# Patient Record
Sex: MALE | Race: WHITE
[De-identification: names, ages, dates, MRNs, and addresses within clinical notes are randomized per-mention and may not be internally consistent; named-entity substitution may affect disease eponyms.]

---

## 2021-07-13 ENCOUNTER — HOSPITAL ENCOUNTER (EMERGENCY)
Dept: HOSPITAL 76 - ED | Age: 26
Discharge: HOME | End: 2021-07-13
Payer: COMMERCIAL

## 2021-07-13 VITALS — SYSTOLIC BLOOD PRESSURE: 138 MMHG | DIASTOLIC BLOOD PRESSURE: 61 MMHG

## 2021-07-13 DIAGNOSIS — Z87.891: ICD-10-CM

## 2021-07-13 DIAGNOSIS — B97.89: ICD-10-CM

## 2021-07-13 DIAGNOSIS — R05: ICD-10-CM

## 2021-07-13 DIAGNOSIS — Z20.822: ICD-10-CM

## 2021-07-13 DIAGNOSIS — J06.9: Primary | ICD-10-CM

## 2021-07-13 PROCEDURE — 99284 EMERGENCY DEPT VISIT MOD MDM: CPT

## 2021-07-13 PROCEDURE — 94640 AIRWAY INHALATION TREATMENT: CPT

## 2021-07-13 NOTE — ED PHYSICIAN DOCUMENTATION
History of Present Illness





- Stated complaint


Stated Complaint: COUGH/CONGESTION





- Chief complaint


Chief Complaint: Resp





- Additonal information


Additional information: 


25-year-old male presents the emergency department for evaluation of a cough 

that has developed over the last 10 days no fevers or chills.  Reports that he 

has received the appropriate to dose Maduri vaccine in April.  No recent travel.

 States the cough is worse in the morning when he wakes up and especially at 

night when supine.  He is denying chest pain or shortness of air.  He is taken 

over-the-counter Sudafed as well as Dimetapp without relief of the cough symptom

s.  Denies a sore throat.








Review of Systems


Constitutional: denies: Fever, Chills


Eyes: reports: Reviewed and negative


Ears: reports: Reviewed and negative


Nose: reports: Reviewed and negative


Throat: reports: Reviewed and negative


Cardiac: reports: Reviewed and negative


Respiratory: reports: Cough.  denies: Dyspnea, Hemoptysis, Wheezing


GI: reports: Reviewed and negative


: reports: Reviewed and negative


Skin: reports: Reviewed and negative


Musculoskeletal: reports: Reviewed and negative





PD PAST MEDICAL HISTORY





- Past Medical History


Past Medical History: No





- Past Surgical History


Past Surgical History: Yes


Derm: Skin cancer surgery





- Present Medications


Home Medications: 


                                Ambulatory Orders











 Medication  Instructions  Recorded  Confirmed


 


Albuterol Sulf [Ventolin Hfa 1 - 2 puffs INH Q4HR PRN #1 inhaler 07/13/21 





Inhaler]   














- Allergies


Allergies/Adverse Reactions: 


                                    Allergies











Allergy/AdvReac Type Severity Reaction Status Date / Time


 


No Known Drug Allergies Allergy   Verified 07/13/21 18:09














- Social History


Does the pt smoke?: No


Smoking Status: Former smoker


Does the pt drink ETOH?: Yes


Does the pt have substance abuse?: No





- Immunizations


Immunizations are current?: Yes





PD ED PE NORMAL





- General


General: Alert and oriented X 3, No acute distress





- HEENT


HEENT: PERRL





- Neck


Neck: Supple, no meningeal sign





- Cardiac


Cardiac: RRR, No murmur





- Respiratory


Respiratory: Clear bilaterally, Other (Cough elicited with full deep breath.)





- Abdomen


Abdomen: Normal bowel sounds, Soft, Non tender, Non distended





- Male 


Male : Deferred





- Back


Back: No CVA TTP, No spinal TTP





Results





- Vitals


Vitals: 


                               Vital Signs - 24 hr











  07/13/21





  18:09


 


Temperature 36.5 C


 


Heart Rate 80


 


Respiratory 16





Rate 


 


Blood Pressure 150/70 H


 


O2 Saturation 99








                                     Oxygen











O2 Source                      Room air

















- Rads (name of study)


  ** CXR


Radiology: Final report received (no acute cardiopulmonary process)





PD MEDICAL DECISION MAKING





- ED course


Complexity details: reviewed results, re-evaluated patient, d/w patient


ED course: 





This is a well-appearing 25-year-old male that presents to the ER for evaluation

of cough for 10 days.  No fevers.  Some congestion.  Chest x-ray is unrevealing.

 He was not hypoxic but every time he took a deep breath it did cause him to 

have a coughing fit.  I did give this gentleman albuterol with a spacer which 

markedly improved his symptoms.  A Covid screen is pending but at this time I 

will defer antibiotics as he does not clinically have findings consistent with a

pneumonia.  Because he got relief with the albuterol I will prescribe that as an

outpatient also recommend Benadryl at night because postnasal drip is likely 

contributing to some of the symptoms.  Emergent return precautions were 

discussed for worsening symptoms.





Departure





- Departure


Disposition: 01 Home, Self Care


Clinical Impression: 


 Cough





Upper respiratory infection


Qualifiers:


 URI type: unspecified viral URI Qualified Code(s): J06.9 - Acute upper 

respiratory infection, unspecified





Condition: Stable


Record reviewed to determine appropriate education?: Yes


Instructions:  ED Viral Syndrome


Prescriptions: 


Albuterol Sulf [Ventolin Hfa Inhaler] 1 - 2 puffs INH Q4HR PRN #1 inhaler


 PRN Reason: Shortness Of Air/Wheezing


Comments: 


You were seen in the ER today for a cough that has persisted for about 10 days. 

Your chest x-ray is normal and does not show pneumonia.  I suspect that you 

likely have seasonal allergies or virus causing the cough.  I would like you to 

use the albuterol that I prescribed with a spacer 4-6 times a day.  He would 

also benefit from using some Benadryl at night which can help with postnasal 

drip and dry up the congestion that is likely contributing to your symptoms.





You have a Covid test pending.  You need to self quarantine until the result is 

done and negative.  Do not leave your house.  Do not get near anybody.  The 

results should be done in 48 to 72 hours.  We will call with a positive result, 

the fastest way to get a negative result for confirmation though is to go to the

hospital website at [email or web address], click on the my [email or web address] tab and

sign up for the patient portal.





If any friends or family get sick and would like to have a Covid test done, but 

do not have signs or symptoms that would necessitate being hospitalized, we 

encourage testing through our coronavirus swabbing station, call [phone] to

schedule an appointment.





If your symptoms are not improving, your cough worsens, you develop chest pain 

or shortness of air or you have any fevers then please return immediately to the

ER for a second evaluation.

## 2022-07-16 NOTE — XRAY REPORT
PROCEDURE:  Chest 1 View X-Ray

 

INDICATIONS:  chest pain

 

TECHNIQUE:  One view of the chest was acquired.  

 

COMPARISON:  None.

 

FINDINGS:  

 

Surgical changes and devices:  None.  

 

Lungs and pleura:  No pleural effusions or pneumothorax. Left costophrenic angle has been excluded. L
ungs are otherwise clear.  

 

Mediastinum:  Mediastinal contours appear normal.  Heart size is normal.  

 

Bones and chest wall:  No suspicious bony lesions.  Overlying soft tissues appear unremarkable.  

 

IMPRESSION:  

Chest without acute cardiopulmonary abnormalities. Of note, the left costophrenic angle was excluded 
off the field-of-view.

 

Reviewed by: Jose De Jesus Cruz MD on 7/13/2021 8:44 PM PDT

Approved by: Jose De Jesus Cruz MD on 7/13/2021 8:44 PM PDT

 

 

Station ID:  [id number] Rest the affected joint, avoiding heavy lifting or activities that aggravate the pain. Let pain be your guide with working, walking and activities. Ice the affected joint every 4-6 hours for 10-15 minutes at a time for the first 24 - 48 hours. After48 hours you may want to try to rotate in heat. Try to keep the affected joint elevated above the level of the heart to prevent more swelling. OTC pain medication as discussed. Tylenol 650-1000 mg every 6-8 hours as needed for pain. Voltaren gel 2 gm to the affected shoulder 4 times daily as needed- massage into the joint pain. Tizanidine (muscle relaxer) 2 mg every bedtime, as needed for pain. May cause drowsiness, balance issues, increase fall risk- blurry vision, constipation, urinary retention. Worsening pain, swelling, change in symptoms should be re-evaluated.    If not improving in a couple weeks, follow up with your primary physician to discuss further evaluation, and/or PT.

## 2022-10-24 ENCOUNTER — HOSPITAL ENCOUNTER (OUTPATIENT)
Dept: HOSPITAL 76 - DI | Age: 27
Discharge: HOME | End: 2022-10-24
Attending: STUDENT IN AN ORGANIZED HEALTH CARE EDUCATION/TRAINING PROGRAM
Payer: COMMERCIAL

## 2022-10-24 DIAGNOSIS — M25.512: Primary | ICD-10-CM

## 2022-10-24 PROCEDURE — 20550 NJX 1 TENDON SHEATH/LIGAMENT: CPT

## 2022-10-24 NOTE — ULTRASOUND REPORT
PROCEDURE:  Injection Single Tendon

 

INDICATIONS:  PAIN IN LEFT SHOULDER

 

TECHNIQUE:  

The indications, alternatives, benefits, risks, and complications of the procedure were explained to 
the patient.  Written informed consent was obtained and placed in the chart.  The patient was placed 
in an appropriate position on the fluoroscopy table, and a site was chosen for percutaneous access un
justin ultrasound guidance.  Local anesthetic was administered using a 1% lidocaine solution.  A hypoder
vicky or spinal needle was then used to access the symptomatic joint.  Intra-articular location of the 
needle tip was confirmed by real time ultrasound imaging, followed by steroid administration.  The ne
edle was then withdrawn, and a bandage applied to the puncture site.  

 

FINDINGS: 

Joint injected:  Left biceps tendon

Medications injected:  1 mL of 40 mg/mL Kenalog and 0.5% Ropivacaine mixture.  

Complications:  None.  

 

IMPRESSION:  Successful ultrasound guided administration of steroid and anaesthetic solution into the
 left biceps tendon sheath.  

 

Reviewed by: Dakota Bledsoe on 10/24/2022 12:05 PM PDT

Approved by: Dakota Bledsoe on 10/24/2022 12:05 PM PDT

 

 

Station ID:  [id number]

## 2023-01-12 ENCOUNTER — HOSPITAL ENCOUNTER (OUTPATIENT)
Dept: HOSPITAL 76 - SC | Age: 28
Discharge: HOME | End: 2023-01-12
Attending: NURSE PRACTITIONER
Payer: COMMERCIAL

## 2023-01-12 VITALS — DIASTOLIC BLOOD PRESSURE: 70 MMHG | SYSTOLIC BLOOD PRESSURE: 114 MMHG

## 2023-01-12 DIAGNOSIS — G47.33: Primary | ICD-10-CM

## 2023-01-12 DIAGNOSIS — E66.9: ICD-10-CM

## 2023-01-12 PROCEDURE — 99212 OFFICE O/P EST SF 10 MIN: CPT

## 2023-01-12 PROCEDURE — 99203 OFFICE O/P NEW LOW 30 MIN: CPT

## 2023-01-12 NOTE — SLEEP CARE CONSULTATION
Information from patient questionnaire entered by Jessica Garcia.





I have reviewed and concur with the information entered by Jessica Garcia. This 

document represents the service I personally performed and the decisions made by

me, Ping Ying ARNP.





History of Present Illness


Service Date and Time: 01/12/2023    1242


Reason for Visit: New patient, sleep apnea on CPAP therapy


Chief Complaint: reports: Unrefreshed sleep, Snoring, Excessive daytime 

sleepiness, Fatigue, Other (UPDATE SUPPLIES)


Date of Onset: SINCE 2015


Usual bedtime: 9PM-10PM


Time it takes to fall asleep: USUALLY NOT LONG 


Snores at night: Yes


Observed to quit breathing while asleep: No


Sleeps alone due to snoring: No


Number of times waking at night: 1-2


Reasons for waking at night: reports: Snoring, Other (DOGS)


Toss, Turn, or Twitch while sleeping: Yes


Recalls having dreams: Yes


Usually gets out of bed at: 5-6AM


Feels refreshed in the morning: No


Morning headache: No


Sleepy or fatigued during the day: Yes


Ever fallen asleep while driving: No


Takes day naps: Yes


Dreams during day naps: Yes


Prior sleep studies: Yes


Year and Where: 2015 [facility]


Additional HPI information: 





CARMEN GÓMEZ was previously diagnosed to have mild, AHI 13.3, obstructive sleep

apnea-hypopnea syndrome and comes in today to establish care for CPAP therapy.





- Parasomnia Symptoms


Ever been unable to move upon waking from sleep: No


Walks in sleep: No


Talks in sleep: Yes


Ever acted out dreams in sleep: No


Ever felt weak in the knees when startled or emotional: No


Bothered by creepy, crawly, restless sensations in legs: No


Problems with memory or concentration: Yes





CPAP Compliance Data





- Data Reviewed with Patient


Average duration of nightly device use: 6 hours 39 minutes


Compliance rate %: 82 (79/90 days used; 10/14/22-1/11/23)


Current pressure setting (cmH2O): 7-14


Average residual AHI: 0.5


Central apnea: 0.1


Obstructive apnea: 0.2


Average large leak: 3.4 LPM


Compliance data discussion: 





He has a ResMed Airsense 10 that was set up in 1/31/2017. He is using a ResMed 

Airfit F20 full face mask. He was previously getting supplies from [facility] but 

does not currently. He is in need of supplies.





Subjective


Missed days of use due to: reports: mask issues (dog chewed on his mask, 

leaking), other (will fall asleep without it)


Patient concerns: reports: air blowing in eyes, mask leak noise, other (skin 

dryness with flaking, uses lotion and resolves).  denies: aerophagia, mask 

discomfort, condensation in mask/hose, nasal congestion, dry mouth, nose, 

throat, epistaxis


Observed to snore while using device: No


Current pressure setting perceived as: comfortable


On therapy, patient: reports: sleeping better, awakening more refreshed, being 

more awake and alert during the day, more rested overall.  denies: drowsiness 

while driving


Initial Inman Sleepiness Scale score: 11 (01/11/23)





Past Medical History


Past Medical History: reports: Other (LEFT SHOULDER SURGERY FEB 9TH LEFT EAR 

ISSUE APPOINTMENT IN MARCH )





Social History


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 Patient is  and lives in [place]. 





Have you smoked in the past 12 months: No


Alcohol use: Yes


Alcohol amount and frequency: 1-2 DRINKS EVERY OTHER WEEKEND 


Caffeine use: Yes


Caffeine amount and frequency: 1-2 CUPS DAILY





Family History


Family history of sleep disordered breathing: No





Allergies and Home Medications


Known drug allergies: No


Drug allergies reviewed: Yes (NKDA)


Home medication list reviewed: Yes (no daily medications)





Review of Systems


Cardiovascular: denies: high blood pressure


Gastrointestinal: denies: heartburn


Neurological: reports: headaches


Psychiatric: denies: Attention Deficit Hyperactivity, anxiety, depression, mood 

disorder


Ear/Nose/Throat: reports: wisdom teeth removed.  denies: tonsillectomy


Musculoskeletal: reports: joint pain, neck pain





Physical Exam


Vital signs obtained and entered by: JESSICA BLOOD MA


Blood Pressure: 114/70 (LEFT ARM)


Cuff size: regular (LEFT ARM)


Heart Rate: 62


O2 Saturation: 96


Height: 6 ft 4 in


Weight: 284 lb 12.8 oz


Body Mass Index: 34.7


BMI Classification: Obese


Neck circumference: 18





Impression and Plan





1. Obstructive Sleep Apnea-Hypopnea Syndrome, mild, with good treatment 

compliance and good apnea control. On CPAP therapy, the patient has better sleep

quality and is more rested overall. Patient has gotten supplies from [facility] but 

has not been able for about a year because he needs an updated prescription. His

current supplies are down and he only has one broken headgear for his mask. He 

last updated his CPAP in 2017. The patients CPAP is over 5 years old and of 

reasonable use. Thus, the CPAP will be  updated. A DWO prescription will be 

made. Compliance guidelines for new device and follow up discussed. Patient's 

apnea severity and rationale for treatment to reduce apnea, improve sleep 

quality and reduce cardiovascular and cerebrovascular events was reviewed. I had

Ananda, Lead Sleep Tech, bring in a replacement F20 full face mask for him to 

use until he gets new machine and supplies. He helped him fit it. He stated that

it fit fine. The sample mask was sent home with him.





* Continue auto CPAP pressure at 7-14 cmH2O


* Update machine


* Update supplies


* Notify me if snoring with mask or feeling that the pressure is too much or too

  little


* Attempt to lose weight


* Call this office if any problems using CPAP


* Return for follow up one month after obtaining new device, or sooner if 

  concerns arise





Mask provided: Yes


Counseling Topics: Spare mask, Weight loss health impact


Visit Type: In Office


Time Spent with Patient (minutes): 36


Provider Statement: I spent 100% of the Face to Face Visit with the patient with

greater than 50% spent counseling the patient and coordination of care.